# Patient Record
Sex: FEMALE | Race: BLACK OR AFRICAN AMERICAN
[De-identification: names, ages, dates, MRNs, and addresses within clinical notes are randomized per-mention and may not be internally consistent; named-entity substitution may affect disease eponyms.]

---

## 2019-12-22 ENCOUNTER — HOSPITAL ENCOUNTER (EMERGENCY)
Dept: HOSPITAL 92 - ERS | Age: 24
Discharge: HOME | End: 2019-12-22
Payer: COMMERCIAL

## 2019-12-22 DIAGNOSIS — A60.04: Primary | ICD-10-CM

## 2019-12-22 PROCEDURE — 99282 EMERGENCY DEPT VISIT SF MDM: CPT

## 2020-02-24 ENCOUNTER — HOSPITAL ENCOUNTER (EMERGENCY)
Dept: HOSPITAL 92 - ERS | Age: 25
Discharge: HOME | End: 2020-02-24
Payer: MEDICAID

## 2020-02-24 DIAGNOSIS — Z79.899: ICD-10-CM

## 2020-02-24 DIAGNOSIS — J06.9: Primary | ICD-10-CM

## 2020-02-24 PROCEDURE — 99283 EMERGENCY DEPT VISIT LOW MDM: CPT

## 2020-04-26 ENCOUNTER — HOSPITAL ENCOUNTER (EMERGENCY)
Dept: HOSPITAL 92 - ERS | Age: 25
Discharge: HOME | End: 2020-04-26
Payer: SELF-PAY

## 2020-04-26 DIAGNOSIS — O26.891: Primary | ICD-10-CM

## 2020-04-26 DIAGNOSIS — R10.30: ICD-10-CM

## 2020-04-26 DIAGNOSIS — Z3A.01: ICD-10-CM

## 2020-04-26 LAB
ALBUMIN SERPL BCG-MCNC: 4.4 G/DL (ref 3.5–5)
ALP SERPL-CCNC: 65 U/L (ref 40–110)
ALT SERPL W P-5'-P-CCNC: 19 U/L (ref 8–55)
ANION GAP SERPL CALC-SCNC: 14 MMOL/L (ref 10–20)
AST SERPL-CCNC: 16 U/L (ref 5–34)
BACTERIA UR QL AUTO: (no result) HPF
BASOPHILS # BLD AUTO: 0 THOU/UL (ref 0–0.2)
BASOPHILS NFR BLD AUTO: 0.8 % (ref 0–1)
BILIRUB SERPL-MCNC: 0.5 MG/DL (ref 0.2–1.2)
BUN SERPL-MCNC: 7 MG/DL (ref 7–18.7)
CALCIUM SERPL-MCNC: 9.6 MG/DL (ref 7.8–10.44)
CHLORIDE SERPL-SCNC: 105 MMOL/L (ref 98–107)
CO2 SERPL-SCNC: 21 MMOL/L (ref 22–29)
CREAT CL PREDICTED SERPL C-G-VRATE: 0 ML/MIN (ref 70–130)
EOSINOPHIL # BLD AUTO: 0.2 THOU/UL (ref 0–0.7)
EOSINOPHIL NFR BLD AUTO: 2.7 % (ref 0–10)
GLOBULIN SER CALC-MCNC: 3.5 G/DL (ref 2.4–3.5)
GLUCOSE SERPL-MCNC: 94 MG/DL (ref 70–105)
HCG SERPL QL: POSITIVE
HGB BLD-MCNC: 12.3 G/DL (ref 12–16)
LEUKOCYTE ESTERASE UR QL STRIP.AUTO: 75 LEU/UL
LYMPHOCYTES # BLD: 1.9 THOU/UL (ref 1.2–3.4)
LYMPHOCYTES NFR BLD AUTO: 33 % (ref 21–51)
MCH RBC QN AUTO: 21.3 PG (ref 27–31)
MCV RBC AUTO: 67.7 FL (ref 78–98)
MDIFF COMPLETE?: YES
MICROCYTES BLD QL SMEAR: (no result) (100X)
MONOCYTES # BLD AUTO: 0.4 THOU/UL (ref 0.11–0.59)
MONOCYTES NFR BLD AUTO: 6.9 % (ref 0–10)
NEUTROPHILS # BLD AUTO: 3.3 THOU/UL (ref 1.4–6.5)
NEUTROPHILS NFR BLD AUTO: 56.7 % (ref 42–75)
PLATELET # BLD AUTO: 260 THOU/UL (ref 130–400)
POTASSIUM SERPL-SCNC: 3.7 MMOL/L (ref 3.5–5.1)
PREGS CONTROL BACKGROUND?: (no result)
PREGS CONTROL BAR APPEAR?: YES
PROT UR STRIP.AUTO-MCNC: 30 MG/DL
RBC # BLD AUTO: 5.77 MILL/UL (ref 4.2–5.4)
RBC UR QL AUTO: (no result) HPF (ref 0–3)
SCHISTOCYTES BLD QL SMEAR: (no result) (100X)
SODIUM SERPL-SCNC: 136 MMOL/L (ref 136–145)
WBC # BLD AUTO: 5.7 THOU/UL (ref 4.8–10.8)

## 2020-04-26 PROCEDURE — 76856 US EXAM PELVIC COMPLETE: CPT

## 2020-04-26 PROCEDURE — 86900 BLOOD TYPING SEROLOGIC ABO: CPT

## 2020-04-26 PROCEDURE — 84702 CHORIONIC GONADOTROPIN TEST: CPT

## 2020-04-26 PROCEDURE — 81015 MICROSCOPIC EXAM OF URINE: CPT

## 2020-04-26 PROCEDURE — 86850 RBC ANTIBODY SCREEN: CPT

## 2020-04-26 PROCEDURE — 36415 COLL VENOUS BLD VENIPUNCTURE: CPT

## 2020-04-26 PROCEDURE — 86901 BLOOD TYPING SEROLOGIC RH(D): CPT

## 2020-04-26 PROCEDURE — 80053 COMPREHEN METABOLIC PANEL: CPT

## 2020-04-26 PROCEDURE — 81003 URINALYSIS AUTO W/O SCOPE: CPT

## 2020-04-26 PROCEDURE — 84703 CHORIONIC GONADOTROPIN ASSAY: CPT

## 2020-04-26 PROCEDURE — 85025 COMPLETE CBC W/AUTO DIFF WBC: CPT

## 2020-04-26 NOTE — ULT
TRANSABDOMINAL TRANSVAGINAL PELVIC ULTRASOUND



DATE:: 4/26/2020 1:31 PM



CLINICAL HISTORY: Vaginal bleeding and cramping.



COMPARISON: Prior exam dated January 9, 2012.



TECHNIQUE: Grayscale, color Doppler and spectral Doppler images were obtained of the pelvis see a tra
nsabdominal and  transvaginal approach



FINDINGS:



UTERUS:   



Size: 7.7 x 5.4 x 5.6 cm 

Mass: None

Cervix: Not well seen

There is a single intrauterine gestation with a yolk sac and fetal pole identified. There are very fa
int heart tones associated with a fetal pole with a cardiac activity of 96 bpm. The estimated

gestational age by crown-rump length is 6 weeks and 0 days. Estimated is December 20, 2020. Clinical 
age is 6 weeks and 6 days with estimated due date of December 14, 2020.



OVARIES:   

Size: Right measures 2.8 x 1.7 cm cm; Left measures 2.8 x 2.2 cm cm 

Mass: There is a 1.5 cm suspected corpus luteal cyst involving the left ovary..  

Flow:  Normal



CUL-DE-SAC: No free fluid 



IMPRESSION: 



Intrauterine gestation of undetermined viability. Cardiac activity is noted at 96 bpm. Continued clin
ical and sonographic follow-up is recommended. 



Reported By: Butch Cobb 

Electronically Signed:  4/26/2020 2:49 PM